# Patient Record
Sex: MALE | Race: BLACK OR AFRICAN AMERICAN | NOT HISPANIC OR LATINO | URBAN - NONMETROPOLITAN AREA
[De-identification: names, ages, dates, MRNs, and addresses within clinical notes are randomized per-mention and may not be internally consistent; named-entity substitution may affect disease eponyms.]

---

## 2020-01-07 ENCOUNTER — IMPORTED ENCOUNTER (OUTPATIENT)
Dept: URBAN - NONMETROPOLITAN AREA CLINIC 1 | Facility: CLINIC | Age: 65
End: 2020-01-07

## 2020-01-07 PROBLEM — H25.813: Noted: 2020-01-07

## 2020-01-07 PROBLEM — E11.9: Noted: 2020-01-07

## 2020-01-07 PROCEDURE — 92015 DETERMINE REFRACTIVE STATE: CPT

## 2020-01-07 PROCEDURE — 92250 FUNDUS PHOTOGRAPHY W/I&R: CPT

## 2020-01-07 PROCEDURE — 99204 OFFICE O/P NEW MOD 45 MIN: CPT

## 2020-01-07 NOTE — PATIENT DISCUSSION
NIDDM s DR CORDON-Stressed the importance of keeping blood sugars under control blood pressure under control and weight normalization and regular visits with PCP. -Explained the possible effects of poorly controlled diabetes and the damage that diabetes can cause to ocular health. -Patient to check HgbA1C.-Pt instructed to contact our office with any vision changes. Cataract OU-Not yet surgical. -Reviewed symptoms of advancing cataract growth such as glare and halos and decreased vision.-Continue to monitor for now. Pt will notify us if any new symptoms develop.

## 2021-01-11 ENCOUNTER — IMPORTED ENCOUNTER (OUTPATIENT)
Dept: URBAN - NONMETROPOLITAN AREA CLINIC 1 | Facility: CLINIC | Age: 66
End: 2021-01-11

## 2021-01-11 PROBLEM — H25.813: Noted: 2021-01-11

## 2021-01-11 PROBLEM — E11.9: Noted: 2021-01-11

## 2021-01-11 PROBLEM — E11.3291: Noted: 2021-01-11

## 2021-01-11 PROCEDURE — 99214 OFFICE O/P EST MOD 30 MIN: CPT

## 2021-01-11 PROCEDURE — 92250 FUNDUS PHOTOGRAPHY W/I&R: CPT

## 2021-01-11 NOTE — PATIENT DISCUSSION
Mild NPDR OD -Stressed the importance of keeping blood sugars under control blood pressure under control and weight normalization and regular visits with PCP. -Explained the possible effects of poorly controlled diabetes and the damage that diabetes can cause to ocular health. -Patient to check HgbA1C.-Due to changes in vessels and a new small reging of MAs OD recommend IVFA next availableNIDDVERNA ruth DR OS-Stressed the importance of keeping blood sugars under control blood pressure under control and weight normalization and regular visits with PCP. -Explained the possible effects of poorly controlled diabetes and the damage that diabetes can cause to ocular health. -Patient to check HgbA1C.-Pt instructed to contact our office with any vision changes. Cataract OU-Not yet surgical. -Reviewed symptoms of advancing cataract growth such as glare and halos and decreased vision.-Continue to monitor for now. Pt will notify us if any new symptoms develop.

## 2021-02-03 ENCOUNTER — IMPORTED ENCOUNTER (OUTPATIENT)
Dept: URBAN - NONMETROPOLITAN AREA CLINIC 1 | Facility: CLINIC | Age: 66
End: 2021-02-03

## 2021-02-03 PROCEDURE — 92250 FUNDUS PHOTOGRAPHY W/I&R: CPT

## 2021-02-03 PROCEDURE — 92235 FLUORESCEIN ANGRPH MLTIFRAME: CPT

## 2021-02-03 NOTE — PATIENT DISCUSSION
Mild NPDR OD -Stressed the importance of keeping blood sugars under control blood pressure under control and weight normalization and regular visits with PCP. -Explained the possible effects of poorly controlled diabetes and the damage that diabetes can cause to ocular health. -Patient to check HgbA1C.-Due to changes in vessels and a new small reging of MAs OD recommend IVFA next availableRANDALL ruth DR OS-Stressed the importance of keeping blood sugars under control blood pressure under control and weight normalization and regular visits with PCP. -Explained the possible effects of poorly controlled diabetes and the damage that diabetes can cause to ocular health. -Patient to check HgbA1C.-Pt instructed to contact our office with any vision changes. *****IVFA today shows:OD: Normal transit Early dye juxtafoveal with late minimal leakage C/W telangectasis. No dye C/W NVD NVEOS: Normal transit. Isolated MA without leakage. No dye C/W NVD NVE. Cataract OU-Not yet surgical. -Reviewed symptoms of advancing cataract growth such as glare and halos and decreased vision.-Continue to monitor for now. Pt will notify us if any new symptoms develop.

## 2022-02-17 ENCOUNTER — ESTABLISHED PATIENT (OUTPATIENT)
Dept: RURAL CLINIC 3 | Facility: CLINIC | Age: 67
End: 2022-02-17

## 2022-02-17 DIAGNOSIS — E11.9: ICD-10-CM

## 2022-02-17 DIAGNOSIS — H40.013: ICD-10-CM

## 2022-02-17 PROCEDURE — 99214 OFFICE O/P EST MOD 30 MIN: CPT

## 2022-02-17 PROCEDURE — 92250 FUNDUS PHOTOGRAPHY W/I&R: CPT

## 2022-02-17 ASSESSMENT — VISUAL ACUITY
OD_PH: 20/20
OD_SC: 20/40
OS_SC: 20/20

## 2022-02-17 ASSESSMENT — TONOMETRY
OD_IOP_MMHG: 20
OS_IOP_MMHG: 20

## 2022-02-17 NOTE — PATIENT DISCUSSION
Discussed diagnosis in detail with patient. Reviewed symptoms related to cataract progression. Recommend refer to Dr. Jaki Felder for cataract evaluation. Patient elects to schedule.

## 2022-04-06 ENCOUNTER — FOLLOW UP (OUTPATIENT)
Dept: RURAL CLINIC 3 | Facility: CLINIC | Age: 67
End: 2022-04-06

## 2022-04-06 DIAGNOSIS — H40.013: ICD-10-CM

## 2022-04-06 PROCEDURE — 99214 OFFICE O/P EST MOD 30 MIN: CPT

## 2022-04-06 PROCEDURE — 92133 CPTRZD OPH DX IMG PST SGM ON: CPT

## 2022-04-06 PROCEDURE — 92083 EXTENDED VISUAL FIELD XM: CPT

## 2022-04-06 ASSESSMENT — VISUAL ACUITY
OD_SC: 20/40-2
OS_SC: 20/20-1
OD_PH: 20/20-1

## 2022-04-06 ASSESSMENT — TONOMETRY
OD_IOP_MMHG: 18
OS_IOP_MMHG: 18

## 2022-04-06 NOTE — PATIENT DISCUSSION
Discussed diagnosis in detail with patient. Reviewed symptoms related to cataract progression. Recommend refer to Dr. Deonte Rojas for cataract evaluation. Patient elects to schedule.

## 2022-04-10 ASSESSMENT — VISUAL ACUITY
OD_PH: 20/20-1
OS_CC: 20/20
OD_CC: 20/50-2
OD_PH: 20/40+
OS_CC: 20/20-2
OD_CC: 20/40

## 2022-04-10 ASSESSMENT — TONOMETRY
OD_IOP_MMHG: 15
OD_IOP_MMHG: 14
OS_IOP_MMHG: 13
OD_IOP_MMHG: 15
OS_IOP_MMHG: 15
OS_IOP_MMHG: 15

## 2022-05-02 ENCOUNTER — CONSULTATION/EVALUATION (OUTPATIENT)
Dept: RURAL CLINIC 3 | Facility: CLINIC | Age: 67
End: 2022-05-02

## 2022-05-02 DIAGNOSIS — H25.13: ICD-10-CM

## 2022-05-02 PROCEDURE — 99214 OFFICE O/P EST MOD 30 MIN: CPT

## 2022-05-02 ASSESSMENT — VISUAL ACUITY
OD_CC: 20/70
OS_SC: 20/40
OS_CC: 20/50
OS_AM: 20/25
OD_PH: 20/30
OS_PH: 20/20
OD_SC: 20/40
OS_BAT: 20/60
OD_PAM: 20/40
OD_BAT: 20/80

## 2022-05-02 ASSESSMENT — TONOMETRY
OD_IOP_MMHG: 18
OS_IOP_MMHG: 18

## 2022-05-02 NOTE — PATIENT DISCUSSION
(Surgical) Visually Significant (secondary to glare), discussed the risks, benefits, alternatives, and limitations of cataract surgery. The patient stated a full understanding and a desire to proceed with the procedure. The patient will need to return for pre-op appointment with cataract measurements and to have any additional questions answered, and start pre-operative eye drops as directed. Pt elects to proceed with OD first and then OS after with Stand/Trad OU & discussed need for bifocals.

## 2022-05-02 NOTE — PATIENT DISCUSSION
Discussed diagnosis in detail with patient. Reviewed symptoms related to cataract progression. Recommend refer to Dr. Ac Bryan for cataract evaluation. Patient elects to schedule.

## 2022-05-18 ENCOUNTER — PRE-OP/H&P (OUTPATIENT)
Dept: RURAL CLINIC 3 | Facility: CLINIC | Age: 67
End: 2022-05-18

## 2022-05-18 VITALS
HEART RATE: 72 BPM | DIASTOLIC BLOOD PRESSURE: 72 MMHG | HEIGHT: 65 IN | BODY MASS INDEX: 25.33 KG/M2 | SYSTOLIC BLOOD PRESSURE: 124 MMHG | WEIGHT: 152 LBS

## 2022-05-18 DIAGNOSIS — Z01.818: ICD-10-CM

## 2022-05-18 PROCEDURE — 99499 UNLISTED E&M SERVICE: CPT

## 2022-05-18 ASSESSMENT — VISUAL ACUITY
OD_PAM: 20/40
OD_CC: 20/70
OD_PH: 20/30
OS_BAT: 20/60
OS_AM: 20/25
OS_CC: 20/50
OS_SC: 20/40
OS_PH: 20/20
OD_SC: 20/40
OD_BAT: 20/80

## 2022-06-16 ENCOUNTER — POST OP/EVAL OF SECOND EYE (OUTPATIENT)
Dept: RURAL CLINIC 3 | Facility: CLINIC | Age: 67
End: 2022-06-16

## 2022-06-16 ENCOUNTER — SURGERY/PROCEDURE (OUTPATIENT)
Dept: RURAL CLINIC 4 | Facility: CLINIC | Age: 67
End: 2022-06-16

## 2022-06-16 VITALS
BODY MASS INDEX: 25.33 KG/M2 | HEIGHT: 65 IN | WEIGHT: 152 LBS | DIASTOLIC BLOOD PRESSURE: 71 MMHG | SYSTOLIC BLOOD PRESSURE: 100 MMHG | HEART RATE: 74 BPM

## 2022-06-16 DIAGNOSIS — Z98.41: ICD-10-CM

## 2022-06-16 DIAGNOSIS — H25.11: ICD-10-CM

## 2022-06-16 PROCEDURE — 99024 POSTOP FOLLOW-UP VISIT: CPT

## 2022-06-16 PROCEDURE — 66984 XCAPSL CTRC RMVL W/O ECP: CPT

## 2022-06-16 PROCEDURE — 68841 INSJ RX ELUT IMPLT LAC CANAL: CPT

## 2022-06-16 ASSESSMENT — VISUAL ACUITY
OS_PH: 20/20
OS_CC: 20/50
OS_AM: 20/25
OD_SC: 20/40
OS_SC: 20/40
OS_BAT: 20/60

## 2022-06-16 ASSESSMENT — TONOMETRY
OS_IOP_MMHG: 21
OD_IOP_MMHG: 23

## 2022-06-16 NOTE — PATIENT DISCUSSION
Medical Clearance done today. No outstanding medical concerns that would preclude surgery and patient is medically cleared to proceed with surgery as scheduled.

## 2022-06-21 ENCOUNTER — POST-OP (OUTPATIENT)
Dept: RURAL CLINIC 3 | Facility: CLINIC | Age: 67
End: 2022-06-21

## 2022-06-21 DIAGNOSIS — Z98.41: ICD-10-CM

## 2022-06-21 PROCEDURE — 99024 POSTOP FOLLOW-UP VISIT: CPT

## 2022-06-21 ASSESSMENT — TONOMETRY
OS_IOP_MMHG: 21
OD_IOP_MMHG: 22

## 2022-06-21 ASSESSMENT — VISUAL ACUITY
OD_SC: 20/20
OS_SC: 20/20

## 2022-06-30 ENCOUNTER — SURGERY/PROCEDURE (OUTPATIENT)
Dept: RURAL CLINIC 4 | Facility: CLINIC | Age: 67
End: 2022-06-30

## 2022-06-30 ENCOUNTER — POST-OP (OUTPATIENT)
Dept: RURAL CLINIC 3 | Facility: CLINIC | Age: 67
End: 2022-06-30

## 2022-06-30 DIAGNOSIS — H25.12: ICD-10-CM

## 2022-06-30 DIAGNOSIS — Z98.41: ICD-10-CM

## 2022-06-30 PROCEDURE — 66984 XCAPSL CTRC RMVL W/O ECP: CPT

## 2022-06-30 PROCEDURE — 99024 POSTOP FOLLOW-UP VISIT: CPT

## 2022-06-30 PROCEDURE — 68841 INSJ RX ELUT IMPLT LAC CANAL: CPT

## 2022-06-30 ASSESSMENT — VISUAL ACUITY
OS_PH: 20/60
OD_SC: 20/15
OS_SC: 20/100

## 2022-06-30 ASSESSMENT — TONOMETRY
OD_IOP_MMHG: 12
OS_IOP_MMHG: 20

## 2022-07-07 ENCOUNTER — POST-OP (OUTPATIENT)
Dept: RURAL CLINIC 3 | Facility: CLINIC | Age: 67
End: 2022-07-07

## 2022-07-07 DIAGNOSIS — Z98.41: ICD-10-CM

## 2022-07-07 DIAGNOSIS — Z98.42: ICD-10-CM

## 2022-07-07 PROCEDURE — 99024 POSTOP FOLLOW-UP VISIT: CPT

## 2022-07-07 ASSESSMENT — TONOMETRY
OS_IOP_MMHG: 12
OD_IOP_MMHG: 13

## 2022-07-07 ASSESSMENT — VISUAL ACUITY
OS_SC: 20/20-2
OD_SC: 20/20

## 2022-10-11 ENCOUNTER — FOLLOW UP (OUTPATIENT)
Dept: RURAL CLINIC 3 | Facility: CLINIC | Age: 67
End: 2022-10-11

## 2022-10-11 DIAGNOSIS — Z96.1: ICD-10-CM

## 2022-10-11 PROCEDURE — 99213 OFFICE O/P EST LOW 20 MIN: CPT

## 2022-10-11 ASSESSMENT — TONOMETRY
OS_IOP_MMHG: 12
OD_IOP_MMHG: 12

## 2022-10-11 ASSESSMENT — VISUAL ACUITY
OD_SC: 20/20-1
OS_SC: 20/20-2

## 2022-10-11 NOTE — PATIENT DISCUSSION
(Low Risk) Patient is considered low risk. Condition was discussed with patient and patient understands. Will continue to monitor patient for any progression in condition. Patient was advised to call us with any problems, questions, or concerns.
Continue with post-operative drops until completed.
Discussed the importance of blood sugar control in the prevention of ocular complications.
Doing well.
Good postoperative appearance.
No evidence of Diabetic Retinopathy.
OTC Reading glasses recommended.
Observe.
Patient advised to call if any problems, questions, or concerns.
Patient understands condition, prognosis and need for follow up care.
Retinal exam findings communicated to Physician managing diabetes.
Stable. Observe.
31

## 2023-07-07 ENCOUNTER — FOLLOW UP (OUTPATIENT)
Dept: RURAL CLINIC 3 | Facility: CLINIC | Age: 68
End: 2023-07-07

## 2023-07-07 DIAGNOSIS — E11.9: ICD-10-CM

## 2023-07-07 DIAGNOSIS — H40.013: ICD-10-CM

## 2023-07-07 PROCEDURE — 92133 CPTRZD OPH DX IMG PST SGM ON: CPT

## 2023-07-07 PROCEDURE — 92083 EXTENDED VISUAL FIELD XM: CPT

## 2023-07-07 PROCEDURE — 99214 OFFICE O/P EST MOD 30 MIN: CPT

## 2023-07-07 ASSESSMENT — VISUAL ACUITY
OD_SC: 20/25+2
OU_SC: 20/20-1
OU_CC: J1+
OS_SC: 20/25+2

## 2023-07-07 ASSESSMENT — TONOMETRY
OS_IOP_MMHG: 11
OD_IOP_MMHG: 11

## 2024-01-09 ENCOUNTER — ESTABLISHED PATIENT (OUTPATIENT)
Dept: RURAL CLINIC 3 | Facility: CLINIC | Age: 69
End: 2024-01-09

## 2024-01-09 DIAGNOSIS — H40.013: ICD-10-CM

## 2024-01-09 DIAGNOSIS — E11.9: ICD-10-CM

## 2024-01-09 DIAGNOSIS — H26.493: ICD-10-CM

## 2024-01-09 DIAGNOSIS — H52.4: ICD-10-CM

## 2024-01-09 PROCEDURE — 92250 FUNDUS PHOTOGRAPHY W/I&R: CPT

## 2024-01-09 PROCEDURE — 92014 COMPRE OPH EXAM EST PT 1/>: CPT

## 2024-01-09 PROCEDURE — 92015 DETERMINE REFRACTIVE STATE: CPT

## 2024-01-09 ASSESSMENT — VISUAL ACUITY
OS_PH: 20/25
OD_SC: 20/20-2
OS_SC: 20/50
OU_SC: 20/15

## 2024-01-09 ASSESSMENT — TONOMETRY
OD_IOP_MMHG: 15
OS_IOP_MMHG: 21

## 2024-07-10 ENCOUNTER — FOLLOW UP (OUTPATIENT)
Dept: RURAL CLINIC 3 | Facility: CLINIC | Age: 69
End: 2024-07-10

## 2024-07-10 DIAGNOSIS — H26.493: ICD-10-CM

## 2024-07-10 DIAGNOSIS — H40.013: ICD-10-CM

## 2024-07-10 DIAGNOSIS — Z96.1: ICD-10-CM

## 2024-07-10 DIAGNOSIS — E11.9: ICD-10-CM

## 2024-07-10 PROCEDURE — 92133 CPTRZD OPH DX IMG PST SGM ON: CPT

## 2024-07-10 PROCEDURE — 99214 OFFICE O/P EST MOD 30 MIN: CPT

## 2024-07-10 ASSESSMENT — VISUAL ACUITY
OS_SC: 20/20
OD_SC: 20/20

## 2024-07-10 ASSESSMENT — TONOMETRY
OS_IOP_MMHG: 18
OD_IOP_MMHG: 18

## 2024-12-17 ENCOUNTER — COMPREHENSIVE EXAM (OUTPATIENT)
Age: 69
End: 2024-12-17

## 2024-12-17 DIAGNOSIS — H40.013: ICD-10-CM

## 2024-12-17 DIAGNOSIS — H26.493: ICD-10-CM

## 2024-12-17 DIAGNOSIS — Z96.1: ICD-10-CM

## 2024-12-17 DIAGNOSIS — E11.9: ICD-10-CM

## 2024-12-17 PROCEDURE — 92250 FUNDUS PHOTOGRAPHY W/I&R: CPT

## 2024-12-17 PROCEDURE — 99214 OFFICE O/P EST MOD 30 MIN: CPT

## 2025-06-17 ENCOUNTER — FOLLOW UP (OUTPATIENT)
Age: 70
End: 2025-06-17

## 2025-06-17 DIAGNOSIS — Z96.1: ICD-10-CM

## 2025-06-17 DIAGNOSIS — H26.493: ICD-10-CM

## 2025-06-17 DIAGNOSIS — H40.013: ICD-10-CM

## 2025-06-17 DIAGNOSIS — E11.9: ICD-10-CM

## 2025-06-17 PROCEDURE — 92133 CPTRZD OPH DX IMG PST SGM ON: CPT

## 2025-06-17 PROCEDURE — 99214 OFFICE O/P EST MOD 30 MIN: CPT

## 2025-06-17 PROCEDURE — 92083 EXTENDED VISUAL FIELD XM: CPT
